# Patient Record
Sex: FEMALE | Race: AMERICAN INDIAN OR ALASKA NATIVE
[De-identification: names, ages, dates, MRNs, and addresses within clinical notes are randomized per-mention and may not be internally consistent; named-entity substitution may affect disease eponyms.]

---

## 2017-05-09 ENCOUNTER — HOSPITAL ENCOUNTER (EMERGENCY)
Dept: HOSPITAL 14 - H.ER | Age: 62
Discharge: HOME | End: 2017-05-09
Payer: COMMERCIAL

## 2017-05-09 VITALS
OXYGEN SATURATION: 97 % | SYSTOLIC BLOOD PRESSURE: 146 MMHG | HEART RATE: 77 BPM | DIASTOLIC BLOOD PRESSURE: 100 MMHG | RESPIRATION RATE: 20 BRPM | TEMPERATURE: 97.7 F

## 2017-05-09 VITALS — BODY MASS INDEX: 36.9 KG/M2

## 2017-05-09 DIAGNOSIS — M79.641: Primary | ICD-10-CM

## 2017-05-09 NOTE — ED PDOC
Upper Extremity Pain/Injury


Time Seen by Provider: 05/09/17 15:32


Chief Complaint (Nursing): Upper Extremity Problem/Injury


Chief Complaint (Provider): Upper extremity problem/injury


History Per: Patient


History/Exam Limitations: no limitations


Onset/Duration Of Symptoms: Hrs


Current Symptoms Are (Timing): Still Present


Additional History Per: Patient


Additional Complaint(s): 





Daly Garcia is a 62 year old female with no past medical history who 

presents to the ED with a chief complaint of a sore right wrist. Patient 

reports to have tripped over a phone wire and landed on her right wrist. The 

pain is non radiating and denies any associated symptoms. Patient was told by 

coworkers to get her wrist checked out because it appeared swollen. Pt denies 

numbness/tingling. 





Past Medical History


Reviewed: Historical Data, Nursing Documentation, Vital Signs


Vital Signs: 


 Last Vital Signs











Temp  97.7 F   05/09/17 15:27


 


Pulse  77   05/09/17 15:27


 


Resp  20   05/09/17 15:27


 


BP  146/100 H  05/09/17 15:27


 


Pulse Ox  97   05/09/17 15:27














- Medical History


PMH: No Chronic Diseases


   Denies: Chronic Kidney Disease





- Surgical History


Surgical History: No Surg Hx





- Family History


Family History: States: No Known Family Hx





- Immunization History


Hx Tetanus Toxoid Vaccination: No


Hx Influenza Vaccination: Yes


Hx Pneumococcal Vaccination: No





- Home Medications


Home Medications: 


 Ambulatory Orders











 Medication  Instructions  Recorded


 


oxyCODONE/Acetaminophen [Percocet 1 tab PO BID PRN #7 tab 12/17/15





5/325 mg Tab]  














- Allergies


Allergies/Adverse Reactions: 


 Allergies











Allergy/AdvReac Type Severity Reaction Status Date / Time


 


No Known Allergies Allergy   Verified 05/09/17 15:26














Review of Systems


ROS Statement: Except As Marked, All Systems Reviewed And Found Negative


Constitutional: Positive for: Weakness (Right wrist)


Musculoskeletal: Positive for: Other (Right sided wrist pain).  Negative for: 

Shoulder Pain, Arm Pain





Physical Exam





- Reviewed


Nursing Documentation Reviewed: Yes


Vital Signs Reviewed: Yes





- Physical Exam


Appears: Positive for: Well, Non-toxic, No Acute Distress


Head Exam: Positive for: ATRAUMATIC, NORMAL INSPECTION, NORMOCEPHALIC


Skin: Positive for: Normal Color, Warm, Dry


Eye Exam: Positive for: Normal appearance


ENT: Positive for: Normal ENT Inspection


Neck: Positive for: Normal


Cardiovascular/Chest: Negative for: Murmur, Tachycardia


Respiratory: Negative for: Accessory Muscle Use, Wheezing, Respiratory Distress


Gastrointestinal/Abdominal: Negative for: Tenderness


Back: Positive for: Normal Inspection


Rectal: Positive for: Deferred


Extremity: Positive for: Normal ROM, Tenderness (Mild Snuffbox Tenderness).  

Negative for: Pedal Edema, Deformity, Swelling, Other (Sensation intact )


Neurologic/Psych: Positive for: Alert, Oriented





- ECG


O2 Sat by Pulse Oximetry: 97 (RA)


Pulse Ox Interpretation: Normal





Medical Decision Making


Medical Decision Making: 





Time: 1532:





Impression: Trauma





Plan: 


* Wrist AP LAT 2 View RT


* Ibuprofen 600mg


* Hand right 3 Views








Scribe Attestation:


Documented by Rodrigue Rodriguez acting as a scribe for Yesica Roberts PA-C.





Provider Attestation:


All medical record entries made by the Scribe were at my direction and 

personally dictated by me. I have reviewed the chart and agree that the record 

accurately reflects my personal performance of the history, physical exam, 

medical decision making, and the department course for this patient. I have 

also personally directed, reviewed, and agree with the discharge instructions 

and disposition.








Disposition





- Clinical Impression


Clinical Impression: 


 Hand pain








- Patient ED Disposition


Is Patient to be Admitted: No


Counseled Patient/Family Regarding: Diagnosis, Need For Followup





- Disposition


Referrals: 


Esequiel Frausto MD [Medical Doctor] - 


Disposition: Routine/Home


Disposition Time: 16:56


Condition: GOOD


Additional Instructions: 


Repeat x-ray in 7 days. 


Instructions:  Hand Sprain (ED)


Forms:  [facility] ED School/Work Excuse

## 2017-05-09 NOTE — RAD
PROCEDURE:  Right Wrist Radiographs.







HISTORY:

right wrist pain, FOOSH



COMPARISON:

None.



FINDINGS:



BONES:

Normal. No fracture.



JOINTS:

Normal. No dislocation. 



SOFT TISSUES:

Normal. 



OTHER FINDINGS:

None.



IMPRESSION:

Normal right wrist radiographs.

## 2017-05-09 NOTE — RAD
PROCEDURE:  Right Hand Radiographs.



HISTORY:

dileep SANTILLAN box tenderness



COMPARISON:

May 9, 2017. Right wrist reported separately.



FINDINGS:



BONES:

Normal. No fracture. 



JOINTS:

Normal. No osteoarthritic changes. 



SOFT TISSUES:

Normal. 



OTHER FINDINGS:

None.



IMPRESSION:

No acute findings related to/accounting  for the clinical 

presentation.

## 2017-06-14 VITALS — RESPIRATION RATE: 18 BRPM

## 2017-06-20 ENCOUNTER — HOSPITAL ENCOUNTER (OUTPATIENT)
Dept: HOSPITAL 14 - H.OPSURG | Age: 62
Discharge: HOME | End: 2017-06-20
Attending: SPECIALIST
Payer: COMMERCIAL

## 2017-06-20 VITALS
DIASTOLIC BLOOD PRESSURE: 78 MMHG | HEART RATE: 69 BPM | SYSTOLIC BLOOD PRESSURE: 134 MMHG | OXYGEN SATURATION: 96 % | TEMPERATURE: 97.6 F

## 2017-06-20 VITALS — BODY MASS INDEX: 36.9 KG/M2

## 2017-06-20 DIAGNOSIS — M25.461: ICD-10-CM

## 2017-06-20 DIAGNOSIS — M25.70: ICD-10-CM

## 2017-06-20 DIAGNOSIS — M23.211: Primary | ICD-10-CM

## 2017-06-20 DIAGNOSIS — M67.861: ICD-10-CM

## 2017-06-20 NOTE — PCM.SURG1
Surgeon's Initial Post Op Note





- Surgeon's Notes


Surgeon: Dr. Fadi MO


Assistant: Dr. Pulido DPM PGY-1, Son Rojas PA-C


Type of Anesthesia: General Endo


Anesthesia Administered By: Dr. Hernandez


Pre-Operative Diagnosis: right knee meniscal tear


Operative Findings: see dictation


Post-Operative Diagnosis: same


Operation Performed: right knee arthroscopy


Specimen/Specimens Removed: none


Estimated Blood Loss: EBL {In ML}: 1


Blood Products Given: N/A


Post-Op Condition: Good


Date of Surgery/Procedure: 06/20/17


Time of Surgery/Procedure: 12:10

## 2017-06-20 NOTE — OP
PROCEDURE DATE: 06/20/2017



DATE OF OPERATION:  06/20/2017

 

ATTENDING PHYSICIAN:  Akiko Aponte MD

 

ASSISTANT:  MADELAINE Garcia 



PREOPERATIVE DIAGNOSES:  

1.  Right knee medial and lateral meniscal tear.

2.  Synovitis.

3.  Chondromalacia.

 

POST- OPERATIVE DIAGNOSES:

1.  Right knee tricompartmental synovitis.

2.  Osteophytes of the trochlea.

3.  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

4.  Grade III chondromalacia of lateral femoral condyle. 

5.  Medial meniscal tear.

6.  Lateral meniscal tear.

7.  Patellofemoral adhesions.



ANESTHESIA:  General. 

 

PROCEDURES:  

1.  Right knee arthroscopy, partial medial and lateral meniscectomy. 

2.  Major synovectomy of all 3 compartments. 

3.  Chondroplasty of medial and lateral femoral condyle.

4.  Abrasion arthroplasty of the trochlea.

5.  Lysis of adhesions of patellofemoral compartment.

6.  Injection of large joint.

7.  22 modifiers of previous surgery. 



EBL:  10 mL. 

 

SPECIMENS:  None.   



DRAINS:  



CLOSURE:  Primary      

                        

FLUIDS:  See anesthesia sheet

 

COMPLICATIONS:  None.  

 

INDICATIONS:  After failing a course of non-operative therapy, the patient elected to undergo the abo
ve procedure. In the office, the risks and possible complications of knee arthroscopy were discussed 
in detail with the patient.  These risks include but are not limited to continued pain, lack of motio
n, infection, vascular injury, DVT / PE, nerve injury including peroneal nerve dysfunction, reflex sy
mpathetic dystrophy, compartment syndrome, unforeseen medical and/or anesthesia complications, limb l
oss, and even death.  The patient expressed an understanding of the risks and possible benefits of th
e procedure, and is also aware of the alternatives to surgery.

 

An informed consent was obtained, and was checked immediately pre-op.

 



PROCEDURE: 

Paragraph 1

The patient was correctly identified in the holding area and the right knee was marked with the surge
ons initials.  The patient was transported to the operating room and placed in the supine position, g
eneral anesthesia was obtained, a pre-operative orthopaedic exam revealed effusion 1+, range of motio
n is from 5 to 120, stable to varus and valgus stress.  

 

Paragraph 2

The lower extremity was prepped and draped in the standard fashion, and the thigh was placed in an ar
throscopic leg luna.  A well-padded tourniquet was applied to the patient's thigh.  Time out was co
mpleted confirming the correct operative site. Esmarch was used to exsanguinate the leg and tournique
t was inflated to 300 mmhg. A standard anterolateral viewing portals were made with a #11 blade after
 sub-dermal 1% Lidocaine with Epinephrine injection.



Paragraph 3

The knee was distended with normal saline and epinephrine in a 1:1,000,000 mixture, at an initial pre
ssure of 35mmHg.  The arthroscope was inserted from the anterolateral portal and moved into the media
l compartment.   Next, the anteromedial working portal was made with spinal needle localization.  The
 arthroscopic probe was inserted, and all compartments of the knee were sequentially visualized.

 

FINDINGS:

Arthroscopic examination of the knee revealed:

1.  Tricompartmental synovitis.

2.  Osteophyte of the trochlea.

3.  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

4.  Grade III chondromalacia of lateral femoral condyle.

5.  A medial meniscal tear.

6.  Lateral meniscal tear.

7.  Patellofemoral adhesions.  

 

The anterior cruciate ligament and posterior cruciate ligament were intact.





Partial medial meniscectomy was performed with a combination of hand instruments and a 4.0 mm motoriz
ed shaver. The meniscus was debrided to a smooth, stable border with an excursion of less than 3 mm.

 

Partial lateral meniscectomy was performed with a combination of hand instruments and a 4.0 mm motori
zed shaver. The meniscus was debrided to a smooth, stable border with an excursion of less than 5 mm.


 

 

The motorized shaver was used to mechanically debride the loose, fibrillated and fragmented chondral 
edges of the medial femoral condyle and lateral femoral condyle to a stable border.  Extreme care was
 taken to not disrupt the adjacent chondral surface. The edges of injured chondral area were probed t
o ensure stability after the shaver was withdrawn from the knee.

 

The motorized shaver was used to perform a synovectomy of the medial, lateral, and patellofemoral com
partments. The hypertrophic synovium was resected with minimal bleeding.  No synovial incarceration w
as noted after synovectomy when the knee was put through a full passive range of motion.

 

 

Due to injuries to the patellofemoral region resulting in organized scar and suprapatellar adhesions,
 a decision was made to perform and anterior interval release to decrease the patellofemoral joint re
action force and relieve pressures over the patella and trochlea.  The synovectomy was carried over t
o the suprapatellar pouch and an anterior interval release was performed over the anterior compartmen
t and the suprapatellar pouch with the motorized shaver.  The anterior fat pad was released and debul
ked during this procedure.  The inflow was shut off and the area checked for hemostasis.  Small bleed
ers were coagulated with the radiofrequency device.

 

At this point, the impinging osteophyte of the trochlea region was addressed utilizing the mechanical
 shaver.  The area was debrided, and an abrasion chondroplasty was performed to prevent further impin
gement.  Care was taken to preserve the surrounding intact chondral and osseous surfaces.  The perime
ter of the debrided area was inspected for loose chondral flaps, which were smoothed with the shaver.


  

Finally, 1 mL of 40 mg Depomedrol mixed with 9 mL of 0.25% Marcaine was injected within the knee join
t. 

  

Post operatively, the patient will be weight bearing as tolerated and will utilize my standard post a
rthroscopy rehab protocol.  The patient will be started on straight leg raising and quadriceps settin
g exercises in the recovery room and will progress to prone hangs as well as prone knee flexion exerc
ises using an active assisted construct.  

 

 

During this procedure, I was assisted by MADELAINE Garcia, who assisted in positioning the patient 
on the operating room table as well as transferring the patient from the operating room table to the 
recovery room stretcher.  In addition, MADELAINE Garcia, assisted me during the actual operative pr
ocedure by positioning the patient's extremity to allow for easier arthroscopic access to all areas o
f the joint.  The presence of MADELAINE Garcai, as my operative assistant was medically necessary t
o ensure the utmost safety of the patient in the pre, intra-, and post-operative periods.  

 

 

Due to the patient's previous procedures performed in this area, right knee arthroscopy and meniscect
ramin, this procedure was more difficult than a standard knee arthroscopy.  This required extra time du
ring prepping and draping, as well as an extended operative time.  Because of the added complexity.





__________________________________________

Akiko Aponte MD







cc:



DD: 06/20/2017 12:09:51  [id number]

TT: 06/20/2017 16:31:33

Job # [id number]

dn

## 2019-01-20 ENCOUNTER — HOSPITAL ENCOUNTER (EMERGENCY)
Dept: HOSPITAL 42 - ED | Age: 64
Discharge: HOME | End: 2019-01-20
Payer: COMMERCIAL

## 2019-01-20 VITALS
SYSTOLIC BLOOD PRESSURE: 140 MMHG | RESPIRATION RATE: 19 BRPM | HEART RATE: 85 BPM | DIASTOLIC BLOOD PRESSURE: 92 MMHG | OXYGEN SATURATION: 99 % | TEMPERATURE: 98 F

## 2019-01-20 VITALS — BODY MASS INDEX: 36.9 KG/M2

## 2019-01-20 DIAGNOSIS — M25.562: Primary | ICD-10-CM

## 2019-01-20 DIAGNOSIS — I10: ICD-10-CM

## 2019-01-20 NOTE — ED PDOC
Arrival/HPI





- General


Chief Complaint: Lower Extremity Problem/Injury


Time Seen by Provider: 01/20/19 13:06


Historian: Patient





- History of Present Illness


Narrative History of Present Illness (Text): 





01/20/19 13:19


63 year old female, whose past medical history includes history of tear of 

meniscus, presents to the emergency department complaining of left knee pain s/p

trip and fall landing on her knee approximately 2 weeks ago. Patient reports she

has a history of torn meniscus in the past. She states she did follow up with 

her orthopedist who performed an x-ray that resulted in negative in fractures 

and has done an MRI at [facility] pending results. 

Patient has a follow up appointment with her orthopedist on 02/06/19. She 

reports she called out of work yesterday due to the pain and is requesting a 

work note. Patient denies any back pain, neck pain, weakness, numbness/tingling,

gait change, or any other complaints. 





PMD: Dr. Rodriguez





Time/Duration: Other (2 weeks)


Symptom Onset: Sudden


Symptom Course: Unchanged


Activities at Onset: Light


Context: Tripped





Past Medical History





- Provider Review


Nursing Documentation Reviewed: Yes





- Cardiac


Hx Cardiac Disorders: No


Hx Hypertension: Yes





- Pulmonary


Hx Respiratory Disorders: No





- Neurological


Hx Neurological Disorder: No





- HEENT


Hx HEENT Disorder: No





- Renal


Hx Renal Disorder: No





- Endocrine/Metabolic


Hx Endocrine Disorders: No





- Hematological/Oncological


Hx Blood Disorders: No


Hx Anemia: Yes


Hx Blood Transfusions: No





- Integumentary


Hx Dermatological Disorder: No





- Musculoskeletal/Rheumatological


Hx Musculoskeletal Disorders: Yes (MEDIAL AND LATERAL MENISCUS TEAR RIGHT KNEE)


Hx Arthritis: Yes


Hx Falls: No


Other/Comment: DISLOCATED LEFT KNEE -REPAIRED





- Gastrointestinal


Hx Gastrointestinal Disorders: Yes (LAP BAND DONE)





- Genitourinary/Gynecological


Hx Genitourinary Disorders: No





- Psychiatric


Hx Psychophysiologic Disorder: No


Hx Substance Use: No





- Surgical History


Hx Arthroscopy: Yes (LEFT KNEE 2012 .RIGHT KNEE 2015)


Hx Hysterectomy: Yes


Hx Orthopedic Surgery: Yes (R knee)


Hx Tubal Ligation: Yes


Other/Comment: LAP BAND DONECYST REMOVED FROM SCALPCYST REMOVED LEFT 

FOOTBUNIONECTOMY BILATERAL FEET





- Anesthesia


Hx Anesthesia: Yes


Hx Anesthesia Reactions: No


Hx Malignant Hyperthermia: No





- Suicidal Assessment


Feels Threatened In Home Enviroment: No





Family/Social History





- Physician Review


Nursing Documentation Reviewed: Yes


Family/Social History: No Known Family HX


Smoking Status: Never Smoked


Hx Alcohol Use: No


Hx Substance Use: No





Allergies/Home Meds


Allergies/Adverse Reactions: 


Allergies





No Known Allergies Allergy (Verified 05/09/17 15:26)


   








Home Medications: 


                                    Home Meds











 Medication  Instructions  Recorded  Confirmed


 


Docusate Sodium [Dok] 100 mg PO DAILY PRN 06/20/17 06/20/17


 


RX: Ondansetron [Zofran Tab] 8 mg PO DAILY PRN 06/20/17 06/20/17


 


RX: amLODIPine [Norvasc] 2.5 mg PO DAILY 06/20/17 06/20/17


 


oxyCODONE/Acetaminophen [Percocet 5 - 325 mg PO Q6 PRN 06/20/17 06/20/17





5/325 mg Tab]   














Review of Systems





- Physician Review


All systems were reviewed & negative as marked: Yes





- Review of Systems


Musculoskeletal: Other (left knee pain).  absent: Back Pain, Neck Pain


Neurological: absent: Gait Changes, Other (weakness or numbness)





Physical Exam





- Physical Exam


Narrative Physical Exam (Text): 





Gen: VS reviewed, alert, well developed, well nourished, nontoxic, mild 

distress.


Ext: Mild tenderness to anterior patella. no edema.


Skin: good color, no rash, no cyanosis.


Psych: responds appropriately to questions, normal affect.


Neuro: oriented x 3, CN2-12 intact grossly, motor intact, sensation intact.





Vital Signs Reviewed: Yes


Temperature: Afebrile


Blood Pressure: Hypertensive


Pulse: Regular


Respiratory Rate: Normal





Medical Decision Making


ED Course and Treatment: 





01/20/19 13:19


Impression:


63 year old female presents complaining of left knee pain s/p trip and fall 

landing on her knee approximately 2 weeks ago. Patient is requesting a work 

note. 





Plan:


-- Prescription for Ibuprofen 600 mg


-- disposition








Progress Notes:








01/20/19 13:46


patient was seen for left knee pain, no recurrent injury since reported injury 

prior to xray and mri workup. there was no calf pain or tenderness to suggest 

DVT, pain localized to bony/tendon aspect of knee. refer back to ortho. patient 

did not accept offer for knee brace.





ultimately, patient simply needed note for work.











- Scribe Statement


The provider has reviewed the documentation as recorded by the Romana Beltran





Provider Peggyibe Attestation:


All medical record entries made by the Romana were at my direction and 

personally dictated by me. I have reviewed the chart and agree that the record 

accurately reflects my personal performance of the history, physical exam, 

medical decision making, and the department course for this patient. I have also

personally directed, reviewed, and agree with the discharge instructions and 

disposition.








Disposition/Present on Arrival





- Present on Arrival


Any Indicators Present on Arrival: No


History of DVT/PE: No


History of Uncontrolled Diabetes: No


Urinary Catheter: No


History of Decub. Ulcer: No


History Surgical Site Infection Following: None





- Disposition


Have Diagnosis and Disposition been Completed?: No


Diagnosis: 


 Knee pain





Disposition: HOME/ ROUTINE


Disposition Time: 13:46


Patient Plan: Discharge


Condition: STABLE


Discharge Instructions (ExitCare):  Knee Pain (DC)


Additional Instructions: 


follow up with your orthopedic surgeon.


Prescriptions: 


RX: Ibuprofen [Motrin Tab] 600 mg PO QID #42 tab


Forms:  [facility] (English), WORK NOTE